# Patient Record
Sex: MALE | Race: WHITE
[De-identification: names, ages, dates, MRNs, and addresses within clinical notes are randomized per-mention and may not be internally consistent; named-entity substitution may affect disease eponyms.]

---

## 2019-07-26 ENCOUNTER — HOSPITAL ENCOUNTER (OUTPATIENT)
Dept: HOSPITAL 84 - D.ER | Age: 49
Setting detail: OBSERVATION
Discharge: HOME | End: 2019-07-26
Attending: INTERNAL MEDICINE | Admitting: INTERNAL MEDICINE
Payer: COMMERCIAL

## 2019-07-26 VITALS — SYSTOLIC BLOOD PRESSURE: 155 MMHG | DIASTOLIC BLOOD PRESSURE: 106 MMHG

## 2019-07-26 VITALS — SYSTOLIC BLOOD PRESSURE: 123 MMHG | DIASTOLIC BLOOD PRESSURE: 85 MMHG

## 2019-07-26 VITALS — WEIGHT: 297.62 LBS | BODY MASS INDEX: 40.31 KG/M2 | HEIGHT: 72 IN

## 2019-07-26 VITALS — SYSTOLIC BLOOD PRESSURE: 128 MMHG | DIASTOLIC BLOOD PRESSURE: 73 MMHG

## 2019-07-26 DIAGNOSIS — R07.9: ICD-10-CM

## 2019-07-26 DIAGNOSIS — K21.9: Primary | ICD-10-CM

## 2019-07-26 LAB
ALBUMIN SERPL-MCNC: 4.2 G/DL (ref 3.4–5)
ALP SERPL-CCNC: 82 U/L (ref 46–116)
ALT SERPL-CCNC: 41 U/L (ref 10–68)
ANION GAP SERPL CALC-SCNC: 12.8 MMOL/L (ref 8–16)
APTT BLD: 29.1 SECONDS (ref 22.8–39.4)
BASOPHILS NFR BLD AUTO: 0.8 % (ref 0–2)
BILIRUB SERPL-MCNC: 0.32 MG/DL (ref 0.2–1.3)
BUN SERPL-MCNC: 18 MG/DL (ref 7–18)
CALCIUM SERPL-MCNC: 9.3 MG/DL (ref 8.5–10.1)
CHLORIDE SERPL-SCNC: 103 MMOL/L (ref 98–107)
CK MB SERPL-MCNC: 2.1 U/L (ref 0–3.6)
CK SERPL-CCNC: 176 UL (ref 21–232)
CO2 SERPL-SCNC: 29.2 MMOL/L (ref 21–32)
CREAT SERPL-MCNC: 1.2 MG/DL (ref 0.6–1.3)
EOSINOPHIL NFR BLD: 2.5 % (ref 0–7)
ERYTHROCYTE [DISTWIDTH] IN BLOOD BY AUTOMATED COUNT: 13.7 % (ref 11.5–14.5)
GLOBULIN SER-MCNC: 3.9 G/L
GLUCOSE SERPL-MCNC: 111 MG/DL (ref 74–106)
HCT VFR BLD CALC: 44.7 % (ref 42–54)
HGB BLD-MCNC: 15.1 G/DL (ref 13.5–17.5)
IMM GRANULOCYTES NFR BLD: 0.3 % (ref 0–5)
INR PPP: 0.93 (ref 0.85–1.17)
LYMPHOCYTES NFR BLD AUTO: 32.1 % (ref 15–50)
MAGNESIUM SERPL-MCNC: 2.4 MG/DL (ref 1.8–2.4)
MCH RBC QN AUTO: 29.4 PG (ref 26–34)
MCHC RBC AUTO-ENTMCNC: 33.8 G/DL (ref 31–37)
MCV RBC: 87 FL (ref 80–100)
MONOCYTES NFR BLD: 6.4 % (ref 2–11)
NEUTROPHILS NFR BLD AUTO: 57.9 % (ref 40–80)
OSMOLALITY SERPL CALC.SUM OF ELEC: 283 MOSM/KG (ref 275–300)
PLATELET # BLD: 359 10X3/UL (ref 130–400)
PMV BLD AUTO: 9.3 FL (ref 7.4–10.4)
POTASSIUM SERPL-SCNC: 4 MMOL/L (ref 3.5–5.1)
PROT SERPL-MCNC: 8.1 G/DL (ref 6.4–8.2)
PROTHROMBIN TIME: 12 SECONDS (ref 11.6–15)
RBC # BLD AUTO: 5.14 10X6/UL (ref 4.2–6.1)
SODIUM SERPL-SCNC: 141 MMOL/L (ref 136–145)
TROPONIN I SERPL-MCNC: 0.02 NG/ML (ref 0–0.06)
WBC # BLD AUTO: 10 10X3/UL (ref 4.8–10.8)

## 2019-07-26 NOTE — DS
PATIENT:SAMMY RAMOS                 :68   MEDICAL RECORD: [id number]
 
                              DISCHARGE SUMMARY
                                                         
ADMISSION DATE:    19                       DISCHARGE DATE:     19
 
 
DIAGNOSES:
1.  Gastroesophageal reflux disease.
2.  Chest pain.
 
HOSPITAL COURSE:  Mr. Ramos presents with chest pain and GERD
symptomatology.  Troponins were normal.  EKGs were normal.  It was relieved with
GI directed medication.  At this time, we will treat him from a GI standpoint. 
No other cardiac workup or treatment is necessary.
 
TRANSINT:[id number] Voice Confirmation ID: [id number] DOCUMENT ID: [id number]
                                           
                                           GERRY LIANG MD             
 
 
 
 
 
 
 
 
 
 
 
 
 
 
 
 
 
 
 
 
 
 
 
 
 
 
 
 
 
 
 
CC:                                                             [id number]
DICTATION DATE: 19 1100     :     19 1039      DIS IN  
                                                                      19
[facility]                                          
[address] [place] [address]

## 2019-07-26 NOTE — HP
PATIENT: SAMMY GIMENEZ                               MEDICAL RECORD: [id number]
ACCOUNT: [id number]                                    LOCATION:[address]
: 68                                            ADMISSION DATE: 19
                                                         PCP: TASHA DANG MD  
 
                             HISTORY AND PHYSICAL EXAMINATION
 
 
DIAGNOSES:
1.  Gastroesophageal reflux disease.
2.  Chest pain.
 
HISTORY OF PRESENT ILLNESS:  This is a gentleman with no cardiac history, who
just last night after he laid down had epigastric pain, burning in his mouth and
a sour taste in his mouth.  This progressed to chest pain.  He presented to the
Emergency Room and was admitted.  He has had no further pain after GI-directed
medication was given.
 
PHYSICAL EXAMINATION:
GENERAL APPEARANCE:  Well-nourished, well-developed, appears stated age.  Level
of distress, comfortable. 
PSYCHIATRIC:  Mental status, alert, normal affect.  Orientation, oriented to
time, place and person.  
EYES:  Lids and conjunctiva, noninjected.  No discharge, no pallor. 
ENT:  Lips, teeth, gums, normal dentition.  Oropharynx, no cyanosis, no pallor. 
NECK:  Carotid arteries, bilateral normal upstroke, no bruits, no thrills. 
JUGULAR VEINS:  No jugular venous pressure or distention. 
CERVICAL LYMPH NODES:  Nontender, nonenlarged.  
THYROID:  Not enlarged.  Nontender.  No nodules. 
LUNGS:  Respiratory effort, unlabored. 
CHEST:  Normal curvature.  No thoracic deformity.  No chest wall tenderness. 
Percussion, resonant.  Auscultation, clear.  No wheezes, no rales, no rhonchi. 
CARDIOVASCULAR:  Precordial exam, nondisplaced.  No heaves or pericardial
thrills.  Rate and rhythm, regular.  Heart sounds, normal S1, normal S2.  No S3,
no gallop, no rub.  Systolic murmur, not heard.  Diastolic murmur, not heard. 
EXTREMITIES:  No cyanosis, no edema.  Peripheral pulses, full and equal in all
extremities, except as noted.  No bruits appreciated. 
ABDOMEN:  Soft, nondistended.  Normal aorta.  No bruit.  Nontender.  No masses. 
Liver, nontender, no hepatomegaly.  Spleen, nontender, no splenomegaly.  
MUSCULOSKELETAL:  No joint tenderness.  No joint swelling.  No erythema.  
NEUROLOGICAL:  Normal gait, normal strength, normal tone.
SKIN:  Warm and dry.
 
OVERALL IMPRESSION:  Chest pain.  This is most likely gastroesophageal reflux
disease and acid reflux.  EKG is normal.  Troponin is normal.  No other cardiac
workup is necessary.
 
TRANSINT:[id number] Voice Confirmation ID: [id number] DOCUMENT ID: [id number]
 
 
 
 
 
HISTORY AND PHYSICAL                           [id number]    SAMMY GIMENEZ, GERRY NOEL             
 
 
 
Electronically Signed by GERRY LIANG on 19 at 1136
 
 
 
 
 
 
 
 
 
 
 
 
 
 
 
 
 
 
 
 
 
 
 
 
 
 
 
 
 
 
 
 
 
 
 
 
 
 
 
 
 
CC:                                                             [id number]
DICTATION DATE: 19 1100     :     19 1115      DIS IN  
                                                                      19
[place] [id number] [place] [id number]